# Patient Record
Sex: FEMALE | Race: WHITE | NOT HISPANIC OR LATINO | Employment: OTHER | URBAN - METROPOLITAN AREA
[De-identification: names, ages, dates, MRNs, and addresses within clinical notes are randomized per-mention and may not be internally consistent; named-entity substitution may affect disease eponyms.]

---

## 2022-02-01 ENCOUNTER — HISTORICAL (OUTPATIENT)
Dept: INFECTIOUS DISEASES | Facility: HOSPITAL | Age: 72
End: 2022-02-01

## 2022-05-14 NOTE — PROGRESS NOTES
Patient:   Lori Nagel             MRN: [id number]            FIN: [id number]               Age:   71 years     Sex:  Female     :  1950   Associated Diagnoses:   None   Author:   Pop Lovell MD 22   Subjective   Additional information This is a 70yo nonvaccinated female with 3 day hx of fever and runny nose; tested (+)22    The patient meets the criteria for the Sotrovimab on the basis of age 70yo, lymphoma,asthma,htn     Have discussed the Emergency Use Authorization of Sotrovimab at length with the patient  today to include the limitations of authorized use in patients with COVID-19 as well as the potential  benefits and the limitations of said benefit. Have also discussed the EUA for the use of the unapproved  products is only intended for the treatment of mild to moderate COVID19 in adults with COVID19 who  weigh at least 40 kg who are at high risk for progressing to severe COVID19 and/or hospitalization. This  high risk is defined as:     * Older age (>/= 65)  * BMI > 25  * Pregnancy  * CKD  * DM  * Immunosuppressive Disease or immunosuppressant medications  * Cardiovascular disease (including congenital heart disease) or HTN  * Chronic lung diseases (ex: COPD, Asthma { moderate / severe, interstitial lung disease, cystic fibrosis,  and pulmonary HTN)  * Sickle cell disease  * Neurodevelopmental disorders (ex: cerebral palsy) or other complex medical conditions (ex: genetic or  metabolic syndromes and severe congenital anomalies)  * Having a medical-related technological dependence (ex: tracheostomy, gastrostomy, or positive  pressure ventilation (not related to COVID)     Have discussed that these drugs must be administered via IV infusion in a supervised setting.     Objective  VSS, afebrile.  A/O x 4  CVS: Regular rate rhythm, no murmurs, gallops, rubs.  Nondisplaced point of maximal impulse  Pulmonary: Clear to auscultation bilaterally, non-tachypneic, unlabored  breathing,  no wheezing, rhonchi, Stridor, or harsh barking cough.     Labs: Positive COVID 19 1/31/22      A/P  Covid 19 positivity        - Have discussed at length with the patient regarding potential risks vs benefit of Sotrovimab infusion. EUA fact sheet for patients, parents, and caregivers has been provided to pt.  Alternative therapy reviewed. Patient is in agreement to receive infusion, plan for same today. Patient  given aftercare instructions and number to call should issues or adverse effects arise.        1. Patient tolerated Sotrovimab infusion very well.  Post infusion patient denies any weakness, dizziness, shortness of breath, symptoms of nausea, vomiting, diarrhea, hypotension, hypertension, bradycardia, tachycardia.  Vital signs stable upon discharge.     2. Instructed patient if current symptoms worsen over the next 24 to 48 hours or any of the above-mentioned symptoms develop follow-up with PCP or go to emergency room ASAP.     3. Instructed patient avoid strenuous activity, follow Covid restrictions of strict isolation for the next 10 days, hydrate with Pedialyte or liquid IV if develops decreased appetite or GI symptoms.

## 2023-07-26 PROBLEM — H25.11 AGE-RELATED NUCLEAR CATARACT OF RIGHT EYE: Status: ACTIVE | Noted: 2023-07-26

## 2023-08-23 PROBLEM — H25.12 AGE-RELATED NUCLEAR CATARACT OF LEFT EYE: Status: ACTIVE | Noted: 2023-08-23

## 2023-08-23 PROBLEM — H25.11 AGE-RELATED NUCLEAR CATARACT OF RIGHT EYE: Status: RESOLVED | Noted: 2023-07-26 | Resolved: 2023-08-23
